# Patient Record
Sex: MALE | Employment: FULL TIME | URBAN - METROPOLITAN AREA
[De-identification: names, ages, dates, MRNs, and addresses within clinical notes are randomized per-mention and may not be internally consistent; named-entity substitution may affect disease eponyms.]

---

## 2020-02-27 ENCOUNTER — HOSPITAL ENCOUNTER (OUTPATIENT)
Facility: CLINIC | Age: 40
Discharge: HOME | End: 2020-02-28
Attending: INTERNAL MEDICINE | Admitting: HOSPITALIST
Payer: COMMERCIAL

## 2020-02-27 ENCOUNTER — TRANSFERRED RECORDS (OUTPATIENT)
Dept: HEALTH INFORMATION MANAGEMENT | Facility: CLINIC | Age: 40
End: 2020-02-27

## 2020-02-27 DIAGNOSIS — J18.9 PNEUMONIA DUE TO INFECTIOUS ORGANISM, UNSPECIFIED LATERALITY, UNSPECIFIED PART OF LUNG: Primary | ICD-10-CM

## 2020-02-27 LAB
ALT SERPL-CCNC: 62 U/L (ref 14–63)
AST SERPL-CCNC: 27 U/L (ref 15–37)
CREAT SERPL-MCNC: 0.95 MG/DL (ref 0.67–1.17)
GFR SERPL CREATININE-BSD FRML MDRD: >60 ML/MIN/1.73ME2 (ref 60–150)
GLUCOSE SERPL-MCNC: 216 MG/DL (ref 74–100)
POTASSIUM SERPL-SCNC: 4 MMOL/L (ref 3.5–5.1)

## 2020-02-27 PROCEDURE — 12000000 ZZH R&B MED SURG/OB

## 2020-02-27 PROCEDURE — G0008 ADMIN INFLUENZA VIRUS VAC: HCPCS

## 2020-02-27 PROCEDURE — 25800030 ZZH RX IP 258 OP 636: Performed by: HOSPITALIST

## 2020-02-27 PROCEDURE — 99223 1ST HOSP IP/OBS HIGH 75: CPT | Mod: AI | Performed by: HOSPITALIST

## 2020-02-27 RX ORDER — LABETALOL HYDROCHLORIDE 5 MG/ML
10 INJECTION, SOLUTION INTRAVENOUS
Status: DISCONTINUED | OUTPATIENT
Start: 2020-02-27 | End: 2020-02-28 | Stop reason: HOSPADM

## 2020-02-27 RX ORDER — HYDROMORPHONE HYDROCHLORIDE 1 MG/ML
0.2 INJECTION, SOLUTION INTRAMUSCULAR; INTRAVENOUS; SUBCUTANEOUS
Status: DISCONTINUED | OUTPATIENT
Start: 2020-02-27 | End: 2020-02-28 | Stop reason: HOSPADM

## 2020-02-27 RX ORDER — ACETAMINOPHEN 325 MG/1
650 TABLET ORAL EVERY 4 HOURS PRN
Status: DISCONTINUED | OUTPATIENT
Start: 2020-02-27 | End: 2020-02-28 | Stop reason: HOSPADM

## 2020-02-27 RX ORDER — POTASSIUM CHLORIDE 1500 MG/1
20-40 TABLET, EXTENDED RELEASE ORAL
Status: DISCONTINUED | OUTPATIENT
Start: 2020-02-27 | End: 2020-02-28 | Stop reason: HOSPADM

## 2020-02-27 RX ORDER — IPRATROPIUM BROMIDE AND ALBUTEROL SULFATE 2.5; .5 MG/3ML; MG/3ML
3 SOLUTION RESPIRATORY (INHALATION) EVERY 4 HOURS PRN
Status: DISCONTINUED | OUTPATIENT
Start: 2020-02-27 | End: 2020-02-27

## 2020-02-27 RX ORDER — ACETAMINOPHEN 650 MG/1
650 SUPPOSITORY RECTAL EVERY 4 HOURS PRN
Status: DISCONTINUED | OUTPATIENT
Start: 2020-02-27 | End: 2020-02-28 | Stop reason: HOSPADM

## 2020-02-27 RX ORDER — AMOXICILLIN 250 MG
1 CAPSULE ORAL 2 TIMES DAILY PRN
Status: DISCONTINUED | OUTPATIENT
Start: 2020-02-27 | End: 2020-02-28 | Stop reason: HOSPADM

## 2020-02-27 RX ORDER — CEFTRIAXONE 2 G/1
2 INJECTION, POWDER, FOR SOLUTION INTRAMUSCULAR; INTRAVENOUS EVERY 24 HOURS
Status: DISCONTINUED | OUTPATIENT
Start: 2020-02-28 | End: 2020-02-28 | Stop reason: HOSPADM

## 2020-02-27 RX ORDER — BISACODYL 10 MG
10 SUPPOSITORY, RECTAL RECTAL DAILY PRN
Status: DISCONTINUED | OUTPATIENT
Start: 2020-02-27 | End: 2020-02-28 | Stop reason: HOSPADM

## 2020-02-27 RX ORDER — LIDOCAINE 40 MG/G
CREAM TOPICAL
Status: DISCONTINUED | OUTPATIENT
Start: 2020-02-27 | End: 2020-02-28 | Stop reason: HOSPADM

## 2020-02-27 RX ORDER — ONDANSETRON 4 MG/1
4 TABLET, ORALLY DISINTEGRATING ORAL EVERY 6 HOURS PRN
Status: DISCONTINUED | OUTPATIENT
Start: 2020-02-27 | End: 2020-02-28 | Stop reason: HOSPADM

## 2020-02-27 RX ORDER — POTASSIUM CHLORIDE 29.8 MG/ML
20 INJECTION INTRAVENOUS
Status: DISCONTINUED | OUTPATIENT
Start: 2020-02-27 | End: 2020-02-28 | Stop reason: HOSPADM

## 2020-02-27 RX ORDER — POLYETHYLENE GLYCOL 3350 17 G/17G
17 POWDER, FOR SOLUTION ORAL DAILY PRN
Status: DISCONTINUED | OUTPATIENT
Start: 2020-02-27 | End: 2020-02-28 | Stop reason: HOSPADM

## 2020-02-27 RX ORDER — GUAIFENESIN/DEXTROMETHORPHAN 100-10MG/5
10 SYRUP ORAL EVERY 4 HOURS PRN
Status: DISCONTINUED | OUTPATIENT
Start: 2020-02-27 | End: 2020-02-28 | Stop reason: HOSPADM

## 2020-02-27 RX ORDER — AMOXICILLIN 250 MG
2 CAPSULE ORAL 2 TIMES DAILY PRN
Status: DISCONTINUED | OUTPATIENT
Start: 2020-02-27 | End: 2020-02-28 | Stop reason: HOSPADM

## 2020-02-27 RX ORDER — NALOXONE HYDROCHLORIDE 0.4 MG/ML
.1-.4 INJECTION, SOLUTION INTRAMUSCULAR; INTRAVENOUS; SUBCUTANEOUS
Status: DISCONTINUED | OUTPATIENT
Start: 2020-02-27 | End: 2020-02-28 | Stop reason: HOSPADM

## 2020-02-27 RX ORDER — POTASSIUM CHLORIDE 7.45 MG/ML
10 INJECTION INTRAVENOUS
Status: DISCONTINUED | OUTPATIENT
Start: 2020-02-27 | End: 2020-02-28 | Stop reason: HOSPADM

## 2020-02-27 RX ORDER — POTASSIUM CHLORIDE 1.5 G/1.58G
20-40 POWDER, FOR SOLUTION ORAL
Status: DISCONTINUED | OUTPATIENT
Start: 2020-02-27 | End: 2020-02-28 | Stop reason: HOSPADM

## 2020-02-27 RX ORDER — POTASSIUM CL/LIDO/0.9 % NACL 10MEQ/0.1L
10 INTRAVENOUS SOLUTION, PIGGYBACK (ML) INTRAVENOUS
Status: DISCONTINUED | OUTPATIENT
Start: 2020-02-27 | End: 2020-02-28 | Stop reason: HOSPADM

## 2020-02-27 RX ORDER — HYDROCODONE BITARTRATE AND ACETAMINOPHEN 5; 325 MG/1; MG/1
1-2 TABLET ORAL EVERY 4 HOURS PRN
Status: DISCONTINUED | OUTPATIENT
Start: 2020-02-27 | End: 2020-02-28 | Stop reason: HOSPADM

## 2020-02-27 RX ORDER — IPRATROPIUM BROMIDE AND ALBUTEROL SULFATE 2.5; .5 MG/3ML; MG/3ML
3 SOLUTION RESPIRATORY (INHALATION) EVERY 4 HOURS PRN
Status: DISCONTINUED | OUTPATIENT
Start: 2020-02-27 | End: 2020-02-28 | Stop reason: HOSPADM

## 2020-02-27 RX ORDER — SODIUM CHLORIDE 9 MG/ML
INJECTION, SOLUTION INTRAVENOUS CONTINUOUS
Status: DISCONTINUED | OUTPATIENT
Start: 2020-02-27 | End: 2020-02-28 | Stop reason: HOSPADM

## 2020-02-27 RX ORDER — ONDANSETRON 2 MG/ML
4 INJECTION INTRAMUSCULAR; INTRAVENOUS EVERY 6 HOURS PRN
Status: DISCONTINUED | OUTPATIENT
Start: 2020-02-27 | End: 2020-02-28 | Stop reason: HOSPADM

## 2020-02-27 RX ORDER — MAGNESIUM SULFATE HEPTAHYDRATE 40 MG/ML
4 INJECTION, SOLUTION INTRAVENOUS EVERY 4 HOURS PRN
Status: DISCONTINUED | OUTPATIENT
Start: 2020-02-27 | End: 2020-02-28 | Stop reason: HOSPADM

## 2020-02-27 RX ADMIN — SODIUM CHLORIDE: 9 INJECTION, SOLUTION INTRAVENOUS at 23:42

## 2020-02-28 VITALS
RESPIRATION RATE: 19 BRPM | TEMPERATURE: 99.6 F | HEART RATE: 88 BPM | DIASTOLIC BLOOD PRESSURE: 81 MMHG | OXYGEN SATURATION: 94 % | SYSTOLIC BLOOD PRESSURE: 145 MMHG

## 2020-02-28 LAB
ALBUMIN SERPL-MCNC: 3.2 G/DL (ref 3.4–5)
ALP SERPL-CCNC: 78 U/L (ref 40–150)
ALT SERPL W P-5'-P-CCNC: 46 U/L (ref 0–70)
ANION GAP SERPL CALCULATED.3IONS-SCNC: 6 MMOL/L (ref 3–14)
AST SERPL W P-5'-P-CCNC: 16 U/L (ref 0–45)
BILIRUB SERPL-MCNC: 1.2 MG/DL (ref 0.2–1.3)
BUN SERPL-MCNC: 11 MG/DL (ref 7–30)
CALCIUM SERPL-MCNC: 8.7 MG/DL (ref 8.5–10.1)
CHLORIDE SERPL-SCNC: 105 MMOL/L (ref 94–109)
CO2 SERPL-SCNC: 24 MMOL/L (ref 20–32)
CREAT SERPL-MCNC: 0.94 MG/DL (ref 0.66–1.25)
ERYTHROCYTE [DISTWIDTH] IN BLOOD BY AUTOMATED COUNT: 12.8 % (ref 10–15)
GFR SERPL CREATININE-BSD FRML MDRD: >90 ML/MIN/{1.73_M2}
GLUCOSE SERPL-MCNC: 165 MG/DL (ref 70–99)
HCT VFR BLD AUTO: 45.1 % (ref 40–53)
HGB BLD-MCNC: 15.1 G/DL (ref 13.3–17.7)
MAGNESIUM SERPL-MCNC: 2 MG/DL (ref 1.6–2.3)
MCH RBC QN AUTO: 30.6 PG (ref 26.5–33)
MCHC RBC AUTO-ENTMCNC: 33.5 G/DL (ref 31.5–36.5)
MCV RBC AUTO: 92 FL (ref 78–100)
PLATELET # BLD AUTO: 209 10E9/L (ref 150–450)
POTASSIUM SERPL-SCNC: 3.8 MMOL/L (ref 3.4–5.3)
PROCALCITONIN SERPL-MCNC: 0.46 NG/ML
PROT SERPL-MCNC: 7.4 G/DL (ref 6.8–8.8)
RBC # BLD AUTO: 4.93 10E12/L (ref 4.4–5.9)
SODIUM SERPL-SCNC: 135 MMOL/L (ref 133–144)
TROPONIN I SERPL-MCNC: <0.015 UG/L (ref 0–0.04)
WBC # BLD AUTO: 9.9 10E9/L (ref 4–11)

## 2020-02-28 PROCEDURE — 90686 IIV4 VACC NO PRSV 0.5 ML IM: CPT | Performed by: HOSPITALIST

## 2020-02-28 PROCEDURE — 84484 ASSAY OF TROPONIN QUANT: CPT | Performed by: HOSPITALIST

## 2020-02-28 PROCEDURE — 36415 COLL VENOUS BLD VENIPUNCTURE: CPT | Performed by: HOSPITALIST

## 2020-02-28 PROCEDURE — 80053 COMPREHEN METABOLIC PANEL: CPT | Performed by: HOSPITALIST

## 2020-02-28 PROCEDURE — 25000132 ZZH RX MED GY IP 250 OP 250 PS 637: Performed by: HOSPITALIST

## 2020-02-28 PROCEDURE — G0378 HOSPITAL OBSERVATION PER HR: HCPCS

## 2020-02-28 PROCEDURE — 83735 ASSAY OF MAGNESIUM: CPT | Performed by: HOSPITALIST

## 2020-02-28 PROCEDURE — 84145 PROCALCITONIN (PCT): CPT | Performed by: HOSPITALIST

## 2020-02-28 PROCEDURE — 99217 ZZC OBSERVATION CARE DISCHARGE: CPT | Performed by: HOSPITALIST

## 2020-02-28 PROCEDURE — 85027 COMPLETE CBC AUTOMATED: CPT | Performed by: HOSPITALIST

## 2020-02-28 PROCEDURE — G0008 ADMIN INFLUENZA VIRUS VAC: HCPCS

## 2020-02-28 PROCEDURE — 25000128 H RX IP 250 OP 636: Performed by: HOSPITALIST

## 2020-02-28 RX ORDER — LEVOFLOXACIN 500 MG/1
TABLET, FILM COATED ORAL
Qty: 5 TABLET | Refills: 0 | Status: SHIPPED | OUTPATIENT
Start: 2020-02-28

## 2020-02-28 RX ADMIN — INFLUENZA A VIRUS A/BRISBANE/02/2018 IVR-190 (H1N1) ANTIGEN (FORMALDEHYDE INACTIVATED), INFLUENZA A VIRUS A/KANSAS/14/2017 X-327 (H3N2) ANTIGEN (FORMALDEHYDE INACTIVATED), INFLUENZA B VIRUS B/PHUKET/3073/2013 ANTIGEN (FORMALDEHYDE INACTIVATED), AND INFLUENZA B VIRUS B/MARYLAND/15/2016 BX-69A ANTIGEN (FORMALDEHYDE INACTIVATED) 0.5 ML: 15; 15; 15; 15 INJECTION, SUSPENSION INTRAMUSCULAR at 11:36

## 2020-02-28 RX ADMIN — ACETAMINOPHEN 650 MG: 325 TABLET, FILM COATED ORAL at 07:19

## 2020-02-28 ASSESSMENT — ACTIVITIES OF DAILY LIVING (ADL)
ADLS_ACUITY_SCORE: 10
ADLS_ACUITY_SCORE: 12
ADLS_ACUITY_SCORE: 10

## 2020-02-28 NOTE — PLAN OF CARE
Pt A/O. VSS on RA, except slightly tachy. C/o pain with coughing/deep breathing, declined intervention. Nonproductive cough. LS coarse. PIV w/ NS at 75 ml/hr. Up independently.

## 2020-02-28 NOTE — PROVIDER NOTIFICATION
"Text page sent to Dr. Marte:  \"Pt is reporting numb fingers on Left hand after blood draw. Please advise. thanks Alysia MILTON RN\"  Waiting on call back    Call-back from MD. No changes to plan of care. See Primary if worsening symptoms.   "

## 2020-02-28 NOTE — DISCHARGE SUMMARY
[facility]    Discharge Summary  Hospitalist    Date of Admission:  2/27/2020  Date of Discharge:  2/28/2020  Discharging Provider: Payam Marte  Date of Service (when I saw the patient): 02/28/20    History of Present Illness   Rajinder López is a 39 year old male who presents with pneumonia.  Admitted for further evaluation and treatment. Community-acquired pneumonia: Patient was CT scan from outside facility show pneumonia, negative for pulmonary embolism.  Patient endorses cough, shortness of breath, fever.  Patient with intermittent chest pain on coughing.  Denies abdominal pain, nausea, vomiting, dysuria, constipation, diarrhea, blood in the stool or urine, lower extremity edema, rash, headache, eye pain, ear pain, sore throat.  Patient was given azithromycin and Rocephin at outside facility.  Troponin was negative.  Initial lactic acid was mildly elevated and returned to normal range prior to discharge and transfer.  Patient reports shaking chills and fever, since the morning of admission.  Patient does have right-sided pleuritic chest pain.  Worse pain with deep breathing.  Denies recent trauma or surgery, no family history of embolism.  Patient denies runny nose.  Patient is taking ibuprofen at home.  Patient states that he smokes 1 cigarette a day for the past several years.    Hospital Course   Rajinder López was admitted on 2/27/2020.  The following problems were addressed during his hospitalization:    Rajinder López is a 39 year old male who presents with pneumonia.  Admitted for further evaluation and treatment.     Community-acquired pneumonia: Patient was CT scan from outside facility show pneumonia, negative for pulmonary embolism.  Patient endorses cough, shortness of breath, fever.  Patient with intermittent chest pain on coughing.  Denies abdominal pain, nausea, vomiting, dysuria, constipation, diarrhea, blood in the stool or urine, lower extremity edema, rash,  headache, eye pain, ear pain, sore throat.  Patient was given azithromycin and Rocephin at outside facility.  Troponin was negative.  Initial lactic acid was mildly elevated and returned to normal range prior to discharge and transfer.  -Continue antibiotic course with levaquin.   - Close outpatient follow up.      History of tobacco use: Patient reports tobacco use in the past.  -Patient educated on cessation, verbalized understanding.     Pending Results   These results will be followed up by PCP  Unresulted Labs Ordered in the Past 30 Days of this Admission     No orders found for last 31 day(s).          Code Status   Full Code       Primary Care Physician   Damaso Brown    Physical Exam   Temp: 99.6  F (37.6  C) Temp src: Oral BP: (!) 145/81 Pulse: 88   Resp: 19 SpO2: 94 % O2 Device: None (Room air)    There were no vitals filed for this visit.  Vital Signs with Ranges  Temp:  [98.5  F (36.9  C)-99.6  F (37.6  C)] 99.6  F (37.6  C)  Pulse:  [88-93] 88  Resp:  [19-20] 19  BP: (138-145)/(72-81) 145/81  SpO2:  [94 %-97 %] 94 %  No intake/output data recorded.    GENERAL: Alert and oriented. NAD. Conversational, appropriate.   HEENT: Normocephalic. EOMI. No icterus or injection. Nares normal.   LUNGS: Coarse bilaterally, improved. No dyspnea at rest.   HEART: Regular rate. Extremities perfused.   ABDOMEN: Soft, nontender, and nondistended. Positive bowel sounds.   EXTREMITIES: No LE edema noted.   NEUROLOGIC: Moves extremities x4 on command. No acute focal neurologic abnormalities noted.     Discharge Disposition   Discharged to home  Condition at discharge: Stable    Consultations This Hospital Stay   None    Time Spent on this Encounter   Payam CELIS DO, personally saw the patient today and spent greater than 30 minutes discharging this patient.    Discharge Orders      Reason for your hospital stay    Pneumonia     Follow-up and recommended labs and tests     Follow up with primary care  provider, Damaso Brown, within 7 days for hospital follow- up.  The following labs/tests are recommended: cbc/bmp.     Activity    Your activity upon discharge: activity as tolerated     Full Code     Diet    Follow this diet upon discharge: Orders Placed This Encounter      Combination Diet Low Saturated Fat Na <2400mg Diet, No Caffeine Diet     Discharge Medications   Current Discharge Medication List      START taking these medications    Details   levofloxacin (LEVAQUIN) 500 MG tablet 5 days  Qty: 5 tablet, Refills: 0    Associated Diagnoses: Pneumonia due to infectious organism, unspecified laterality, unspecified part of lung         CONTINUE these medications which have NOT CHANGED    Details   albuterol (PROAIR HFA, PROVENTIL HFA, VENTOLIN HFA) 108 (90 BASE) MCG/ACT inhaler Inhale 2 puffs into the lungs 4 times daily as needed for shortness of breath / dyspnea  Qty: 1 Inhaler, Refills: 2    Comments: Give whatever form of Albuterol HFA that is cheapest for the patient, including Ventolin HFA, Proventil HFA, Proair HFA, or Xopenex HFA  Associated Diagnoses: Bronchospasm      desonide (DESOWEN) 0.05 % cream Apply sparingly once or twice per day as needed to affected area until the skin is better, then stop  Qty: 30 g, Refills: 0    Associated Diagnoses: Dermatitis      EPINEPHrine (EPIPEN) 0.3 MG/0.3ML injection Inject 0.3 mLs into the muscle once as needed for anaphylaxis for 1 dose.  Qty: 2 each, Refills: 2    Associated Diagnoses: Anaphylactic reaction to bee sting      Ibuprofen (ADVIL PO) Take  by mouth.    Associated Diagnoses: Bronchitis with bronchospasm           Allergies   Allergies   Allergen Reactions     Bee Venom Anaphylaxis and Swelling     Data   Most Recent 3 CBC's:  Recent Labs   Lab Test 02/28/20  0707   WBC 9.9   HGB 15.1   MCV 92         Most Recent 3 BMP's:  Recent Labs   Lab Test 02/28/20  0707      POTASSIUM 3.8   CHLORIDE 105   CO2 24   BUN 11   CR 0.94    ANIONGAP 6   RITA 8.7   *     Most Recent 2 LFT's:  Recent Labs   Lab Test 02/28/20  0707   AST 16   ALT 46   ALKPHOS 78   BILITOTAL 1.2     Most Recent INR's and Anticoagulation Dosing History:  Anticoagulation Dose History     There is no flowsheet data to display.        Most Recent 3 Troponin's:  Recent Labs   Lab Test 02/28/20  0707   TROPI <0.015     Most Recent Cholesterol Panel:No lab results found.  Most Recent 6 Bacteria Isolates From Any Culture (See EPIC Reports for Culture Details):  Recent Labs   Lab Test 11/14/13  1203   CULT No Beta Streptococcus isolated     Most Recent TSH, T4 and A1c Labs:No lab results found.  Results for orders placed or performed in visit on 07/24/12   X-ray Chest 2 vws*    Impression       CHEST, PA AND LATERAL   Jul 24, 2012 3:01:00 PM       HISTORY: Acute bronchitis.      COMPARISON: None.     FINDINGS: The lungs are expanded and clear. Heart and mediastinal  contour unremarkable.       IMPRESSION: Negative.

## 2020-02-28 NOTE — PLAN OF CARE
Pt Aox4 and up independently. Heart healthy diet, Tylenol given this AM for muscle/breathing pain r/t pneumonia. BP elevated within parameters. T max 99.6. Flu shot given before discharge. Plan to discharge to home via Cab. Discharge, meds, and follow up, AVS reviewed with patient prior to discharge.

## 2020-02-28 NOTE — PHARMACY-ADMISSION MEDICATION HISTORY
Pharmacy Medication History  Admission medication history interview status for the 2/27/2020  admission is complete. See EPIC admission navigator for prior to admission medications     Medication history sources: Patient  Medication history source reliability: Good  Adherence assessment: N/A    Significant changes made to the medication list:  Removed everything except Epi-pen which pt says he hasn't used in years      Additional medication history information:   none    Medication reconciliation completed by provider prior to medication history? N/A    Time spent in this activity: 10 minutes      Prior to Admission medications    Medication Sig Last Dose Taking? Auth Provider   levofloxacin (LEVAQUIN) 500 MG tablet 5 days  Yes Payam Marte,    EPINEPHrine (EPIPEN) 0.3 MG/0.3ML injection Inject 0.3 mLs into the muscle once as needed for anaphylaxis for 1 dose. More than a month at Unknown time  Damaso Brown MD

## 2020-02-28 NOTE — H&P
[facility]    History and Physical  Hospitalist       Date of Admission:  2/27/2020  Date of Service (when I saw the patient): 02/27/20    Assessment & Plan   Rajinder López is a 39 year old male who presents with pneumonia.  Admitted for further evaluation and treatment.    Community-acquired pneumonia: Patient was CT scan from outside facility show pneumonia, negative for pulmonary embolism.  Patient endorses cough, shortness of breath, fever.  Patient with intermittent chest pain on coughing.  Denies abdominal pain, nausea, vomiting, dysuria, constipation, diarrhea, blood in the stool or urine, lower extremity edema, rash, headache, eye pain, ear pain, sore throat.  Patient was given azithromycin and Rocephin at outside facility.  Troponin was negative.  Initial lactic acid was mildly elevated and returned to normal range prior to discharge and transfer.  -Rocephin and azithromycin.  -Pulmonary hygiene.  -Gentle IV fluids.  -Supportive measures.  -Close hemodynamic monitoring.    History of tobacco use: Patient reports tobacco use in the past.  -Patient educated on cessation, verbalized understanding.    DVT Prophylaxis: Pneumatic Compression Devices  Code Status: Full Code    Disposition: Inpatient.    Dr. Payam Marte D.O.  [facility] Hospitalist  Pager [phone]    Primary Care Physician   Damaso Brown    Chief Complaint   Fever.    History is obtained from the patient and medical records.     History of Present Illness   Rajinder López is a 39 year old male who presents with pneumonia.  Admitted for further evaluation and treatment. Community-acquired pneumonia: Patient was CT scan from outside facility show pneumonia, negative for pulmonary embolism.  Patient endorses cough, shortness of breath, fever.  Patient with intermittent chest pain on coughing.  Denies abdominal pain, nausea, vomiting, dysuria, constipation, diarrhea, blood in the stool or urine, lower  extremity edema, rash, headache, eye pain, ear pain, sore throat.  Patient was given azithromycin and Rocephin at outside facility.  Troponin was negative.  Initial lactic acid was mildly elevated and returned to normal range prior to discharge and transfer.  Patient reports shaking chills and fever, since the morning of admission.  Patient does have right-sided pleuritic chest pain.  Worse pain with deep breathing.  Denies recent trauma or surgery, no family history of embolism.  Patient denies runny nose.  Patient is taking ibuprofen at home.  Patient states that he smokes 1 cigarette a day for the past several years.    Past Medical History    I have reviewed this patient's medical history and updated it with pertinent information if needed.   Past Medical History:   Diagnosis Date     Anaphylactic reaction to bee sting     severe allergic reaction to bee stings as child, (acute swelling)       Past Surgical History   I have reviewed this patient's surgical history and updated it with pertinent information if needed.  Past Surgical History:   Procedure Laterality Date     No prior surgeries         Prior to Admission Medications   Prior to Admission Medications   Prescriptions Last Dose Informant Patient Reported? Taking?   EPINEPHrine (EPIPEN) 0.3 MG/0.3ML injection   No No   Sig: Inject 0.3 mLs into the muscle once as needed for anaphylaxis for 1 dose.   Ibuprofen (ADVIL PO)   Yes No   Sig: Take  by mouth.   albuterol (PROAIR HFA, PROVENTIL HFA, VENTOLIN HFA) 108 (90 BASE) MCG/ACT inhaler   No No   Sig: Inhale 2 puffs into the lungs 4 times daily as needed for shortness of breath / dyspnea   desonide (DESOWEN) 0.05 % cream   No No   Sig: Apply sparingly once or twice per day as needed to affected area until the skin is better, then stop      Facility-Administered Medications: None     Allergies   Allergies   Allergen Reactions     Bee Venom Anaphylaxis and Swelling       Social History   I have reviewed this  patient's social history and updated it with pertinent information if needed. Rajinder López  reports that he quit smoking about 7 years ago. He has a 6.00 pack-year smoking history. He has never used smokeless tobacco. He reports current alcohol use. He reports that he does not use drugs.    Family History   I have reviewed this patient's family history and updated it with pertinent information if needed.   Family History   Problem Relation Age of Onset     Breast Cancer Mother 48         age 49 breast cancer     Diabetes Father      Cancer Father      Blood Disease Father         Hepatitis C     Family History Negative Sister        Review of Systems   The 10 point Review of Systems is negative other than noted in the HPI or here.     Physical Exam   Temp: 98.8  F (37.1  C) Temp src: Oral BP: 138/72 Pulse: 91   Resp: 20 SpO2: 97 % O2 Device: None (Room air)    Vital Signs with Ranges  Temp:  [98.5  F (36.9  C)-98.8  F (37.1  C)] 98.8  F (37.1  C)  Pulse:  [91-93] 91  Resp:  [20] 20  BP: (138-144)/(72-80) 138/72  SpO2:  [97 %] 97 %  0 lbs 0 oz    GENERAL: Alert and oriented. NAD. Conversational, appropriate.   HEENT: Normocephalic. EOMI. No icterus or injection. Nares normal.   LUNGS: Coarse bilaterally. No dyspnea at rest.   HEART: Regular rate. Extremities perfused.   ABDOMEN: Soft, nontender, and nondistended. Positive bowel sounds.   EXTREMITIES: No LE edema noted.   NEUROLOGIC: Moves extremities x4 on command. No acute focal neurologic abnormalities noted.     Data   Data reviewed today:  I personally reviewed both laboratory and imaging data.   No lab results found in last 7 days.    No results found for this or any previous visit (from the past 24 hour(s)).